# Patient Record
Sex: MALE | HISPANIC OR LATINO | URBAN - METROPOLITAN AREA
[De-identification: names, ages, dates, MRNs, and addresses within clinical notes are randomized per-mention and may not be internally consistent; named-entity substitution may affect disease eponyms.]

---

## 2023-10-24 ENCOUNTER — LAB OUTSIDE AN ENCOUNTER (OUTPATIENT)
Dept: URBAN - METROPOLITAN AREA CLINIC 98 | Facility: CLINIC | Age: 48
End: 2023-10-24

## 2023-10-24 ENCOUNTER — TELEPHONE ENCOUNTER (OUTPATIENT)
Dept: URBAN - METROPOLITAN AREA CLINIC 98 | Facility: CLINIC | Age: 48
End: 2023-10-24

## 2023-10-24 ENCOUNTER — OFFICE VISIT (OUTPATIENT)
Dept: URBAN - METROPOLITAN AREA CLINIC 98 | Facility: CLINIC | Age: 48
End: 2023-10-24
Payer: SELF-PAY

## 2023-10-24 VITALS
HEART RATE: 62 BPM | TEMPERATURE: 97.3 F | WEIGHT: 183.4 LBS | HEIGHT: 66 IN | DIASTOLIC BLOOD PRESSURE: 79 MMHG | BODY MASS INDEX: 29.47 KG/M2 | SYSTOLIC BLOOD PRESSURE: 134 MMHG

## 2023-10-24 DIAGNOSIS — Z12.11 COLON CANCER SCREENING: ICD-10-CM

## 2023-10-24 PROCEDURE — 99203 OFFICE O/P NEW LOW 30 MIN: CPT | Performed by: INTERNAL MEDICINE

## 2023-10-24 NOTE — HPI-TODAY'S VISIT:
Patient referred by his PCP for CRC screening. Patient cannot remember name of his PCP. 47 yo pt who's due for CRC screening. No prior colonoscopy. She has been asx from LGI point of view: no abdominal pain, change in bowel pattern. Has had intermittent + FOB, wo melenic stools and no hematochezia. Strong FHx CC in his father at age 60. No FHx  colon pp / IBD / GI malignancies. Denies anorexia or weight loss. No cardiorespiratory or constitutional sxs. CPE normal a year ago. No other complaints after extensive ROS.

## 2023-10-25 ENCOUNTER — DASHBOARD ENCOUNTERS (OUTPATIENT)
Age: 48
End: 2023-10-25